# Patient Record
Sex: OTHER/UNKNOWN | URBAN - METROPOLITAN AREA
[De-identification: names, ages, dates, MRNs, and addresses within clinical notes are randomized per-mention and may not be internally consistent; named-entity substitution may affect disease eponyms.]

---

## 2023-08-23 ENCOUNTER — APPOINTMENT (OUTPATIENT)
Dept: URBAN - METROPOLITAN AREA CLINIC 252 | Age: 28
Setting detail: DERMATOLOGY
End: 2023-08-28

## 2023-08-23 VITALS — HEIGHT: 63 IN | RESPIRATION RATE: 18 BRPM | WEIGHT: 140 LBS

## 2023-08-23 DIAGNOSIS — R21 RASH AND OTHER NONSPECIFIC SKIN ERUPTION: ICD-10-CM

## 2023-08-23 PROCEDURE — OTHER VENIPUNCTURE: OTHER

## 2023-08-23 PROCEDURE — 99203 OFFICE O/P NEW LOW 30 MIN: CPT

## 2023-08-23 PROCEDURE — OTHER ORDER TESTS: OTHER

## 2023-08-23 PROCEDURE — OTHER PRESCRIPTION MEDICATION MANAGEMENT: OTHER

## 2023-08-23 PROCEDURE — OTHER COUNSELING: OTHER

## 2023-08-23 PROCEDURE — 36415 COLL VENOUS BLD VENIPUNCTURE: CPT

## 2023-08-23 ASSESSMENT — LOCATION DETAILED DESCRIPTION DERM
LOCATION DETAILED: SUPERIOR THORACIC SPINE
LOCATION DETAILED: LEFT PROXIMAL LATERAL POSTERIOR UPPER ARM
LOCATION DETAILED: UPPER STERNUM
LOCATION DETAILED: RIGHT PROXIMAL POSTERIOR UPPER ARM

## 2023-08-23 ASSESSMENT — LOCATION SIMPLE DESCRIPTION DERM
LOCATION SIMPLE: RIGHT POSTERIOR UPPER ARM
LOCATION SIMPLE: LEFT POSTERIOR UPPER ARM
LOCATION SIMPLE: CHEST
LOCATION SIMPLE: UPPER BACK

## 2023-08-23 ASSESSMENT — LOCATION ZONE DERM
LOCATION ZONE: TRUNK
LOCATION ZONE: ARM

## 2023-08-23 NOTE — HPI: RASH
What Type Of Note Output Would You Prefer (Optional)?: Bullet Format
How Severe Is Your Rash?: moderate
Is This A New Presentation, Or A Follow-Up?: Rash
Additional History: Hot hearted notice rash\\nHx of anxiety going to therapy \\nMedication for anxiety SSRI\\nCymbalta or citlapram in the past\\nLast px in February no blood work \\n

## 2023-08-23 NOTE — PROCEDURE: ORDER TESTS
Billing Type: Third-Party Bill
Expected Date Of Service: 08/23/2023
Performing Laboratory: [id number]
Bill For Surgical Tray: no

## 2023-08-23 NOTE — PROCEDURE: COUNSELING
Patient Specific Counseling (Will Not Stick From Patient To Patient): Recommended lab work to rule out any underlining, thyroid disease or autoimmune disease. Discussed that antihistamines can be helpful to treat and prevent.\\n\\nPatient expresses concerns for flaring during her wedding next year as stress can cause the flare, discussed that there are some medication’s that can be helpful-will discuss further once bloodwork comes back.  Informed her that certain medications commonly used for blood pressure can be prescribed.
Detail Level: Simple

## 2023-08-23 NOTE — PROCEDURE: PRESCRIPTION MEDICATION MANAGEMENT
Detail Level: Zone
Plan: Initiate OTC loratadine or clartin once daily.   Will discuss tx options on labs come back
Render In Strict Bullet Format?: No

## 2023-09-26 ENCOUNTER — APPOINTMENT (OUTPATIENT)
Dept: URBAN - METROPOLITAN AREA CLINIC 252 | Age: 28
Setting detail: DERMATOLOGY
End: 2023-09-26

## 2023-09-26 VITALS
HEIGHT: 63 IN | WEIGHT: 140 LBS | HEART RATE: 60 BPM | SYSTOLIC BLOOD PRESSURE: 121 MMHG | RESPIRATION RATE: 18 BRPM | DIASTOLIC BLOOD PRESSURE: 74 MMHG

## 2023-09-26 DIAGNOSIS — R21 RASH AND OTHER NONSPECIFIC SKIN ERUPTION: ICD-10-CM

## 2023-09-26 PROCEDURE — OTHER PRESCRIPTION: OTHER

## 2023-09-26 PROCEDURE — OTHER COUNSELING: OTHER

## 2023-09-26 PROCEDURE — 99213 OFFICE O/P EST LOW 20 MIN: CPT

## 2023-09-26 RX ORDER — CLONIDINE HYDROCHLORIDE 0.1 MG/1
0.1MG TABLET ORAL BID
Qty: 30 | Refills: 1 | Status: ERX | COMMUNITY
Start: 2023-09-26

## 2023-09-26 ASSESSMENT — LOCATION ZONE DERM
LOCATION ZONE: ARM
LOCATION ZONE: TRUNK

## 2023-09-26 ASSESSMENT — LOCATION DETAILED DESCRIPTION DERM
LOCATION DETAILED: UPPER STERNUM
LOCATION DETAILED: LEFT PROXIMAL LATERAL POSTERIOR UPPER ARM
LOCATION DETAILED: RIGHT PROXIMAL POSTERIOR UPPER ARM
LOCATION DETAILED: SUPERIOR THORACIC SPINE

## 2023-09-26 NOTE — PROCEDURE: COUNSELING
Patient Specific Counseling (Will Not Stick From Patient To Patient): Discussed the treatment options. Patient states that this rash flares situationally,  clonadine 0.1mg up to BID PRN, recommended starting with 0.1 tab at home to make certain that she reacts appropriately and without SE.\\nSide effects discussed , lower blood pressure , dizziness , headache , drowsiness , fatigue \\nDon’t take before driving
Detail Level: Simple

## 2023-10-30 ENCOUNTER — RX ONLY (RX ONLY)
Age: 28
End: 2023-10-30

## 2023-10-30 RX ORDER — CLONIDINE HYDROCHLORIDE 0.1 MG/1
0.1MG TABLET ORAL BID
Qty: 30 | Refills: 0 | Status: ERX

## 2023-11-06 ENCOUNTER — RX ONLY (RX ONLY)
Age: 28
End: 2023-11-06

## 2023-11-06 RX ORDER — CLONIDINE HYDROCHLORIDE 0.1 MG/1
0.1MG TABLET ORAL BID
Qty: 90 | Refills: 0 | Status: ERX